# Patient Record
Sex: FEMALE | Race: WHITE | NOT HISPANIC OR LATINO | URBAN - METROPOLITAN AREA
[De-identification: names, ages, dates, MRNs, and addresses within clinical notes are randomized per-mention and may not be internally consistent; named-entity substitution may affect disease eponyms.]

---

## 2018-09-07 ENCOUNTER — OUTPATIENT (OUTPATIENT)
Dept: OUTPATIENT SERVICES | Facility: HOSPITAL | Age: 23
LOS: 1 days | End: 2018-09-07

## 2018-09-07 DIAGNOSIS — O26.90 PREGNANCY RELATED CONDITIONS, UNSPECIFIED, UNSPECIFIED TRIMESTER: ICD-10-CM

## 2018-09-07 DIAGNOSIS — Z3A.00 WEEKS OF GESTATION OF PREGNANCY NOT SPECIFIED: ICD-10-CM

## 2018-09-08 ENCOUNTER — INPATIENT (INPATIENT)
Facility: HOSPITAL | Age: 23
LOS: 2 days | Discharge: HOME | End: 2018-09-11
Attending: SPECIALIST | Admitting: SPECIALIST

## 2018-09-08 DIAGNOSIS — O26.899 OTHER SPECIFIED PREGNANCY RELATED CONDITIONS, UNSPECIFIED TRIMESTER: ICD-10-CM

## 2018-09-08 DIAGNOSIS — Z3A.00 WEEKS OF GESTATION OF PREGNANCY NOT SPECIFIED: ICD-10-CM

## 2018-09-09 ENCOUNTER — TRANSCRIPTION ENCOUNTER (OUTPATIENT)
Age: 23
End: 2018-09-09

## 2018-09-09 VITALS — WEIGHT: 265.66 LBS | HEIGHT: 66 IN

## 2018-09-09 LAB
BASOPHILS # BLD AUTO: 0.02 K/UL — SIGNIFICANT CHANGE UP (ref 0–0.2)
BASOPHILS NFR BLD AUTO: 0.2 % — SIGNIFICANT CHANGE UP (ref 0–2)
BLD GP AB SCN SERPL QL: NEGATIVE — SIGNIFICANT CHANGE UP
EOSINOPHIL # BLD AUTO: 0.05 K/UL — SIGNIFICANT CHANGE UP (ref 0–0.5)
EOSINOPHIL NFR BLD AUTO: 0.4 % — SIGNIFICANT CHANGE UP (ref 0–6)
HCT VFR BLD CALC: 38.6 % — SIGNIFICANT CHANGE UP (ref 34.5–45)
HGB BLD-MCNC: 12.4 G/DL — SIGNIFICANT CHANGE UP (ref 11.5–15.5)
IMM GRANULOCYTES # BLD AUTO: 0.07 # — SIGNIFICANT CHANGE UP
IMM GRANULOCYTES NFR BLD AUTO: 0.6 % — SIGNIFICANT CHANGE UP (ref 0–1.5)
LYMPHOCYTES # BLD AUTO: 19.6 % — SIGNIFICANT CHANGE UP (ref 13–44)
LYMPHOCYTES # BLD AUTO: 2.22 K/UL — SIGNIFICANT CHANGE UP (ref 1–3.3)
MCHC RBC-ENTMCNC: 26.6 PG — LOW (ref 27–34)
MCHC RBC-ENTMCNC: 32.1 % — SIGNIFICANT CHANGE UP (ref 32–36)
MCV RBC AUTO: 82.8 FL — SIGNIFICANT CHANGE UP (ref 80–100)
MONOCYTES # BLD AUTO: 0.66 K/UL — SIGNIFICANT CHANGE UP (ref 0–0.9)
MONOCYTES NFR BLD AUTO: 5.8 % — SIGNIFICANT CHANGE UP (ref 2–14)
NEUTROPHILS # BLD AUTO: 8.29 K/UL — HIGH (ref 1.8–7.4)
NEUTROPHILS NFR BLD AUTO: 73.4 % — SIGNIFICANT CHANGE UP (ref 43–77)
NRBC # FLD: 0 — SIGNIFICANT CHANGE UP
PLATELET # BLD AUTO: 245 K/UL — SIGNIFICANT CHANGE UP (ref 150–400)
PMV BLD: 11.8 FL — SIGNIFICANT CHANGE UP (ref 7–13)
RBC # BLD: 4.66 M/UL — SIGNIFICANT CHANGE UP (ref 3.8–5.2)
RBC # FLD: 14.7 % — HIGH (ref 10.3–14.5)
RH IG SCN BLD-IMP: POSITIVE — SIGNIFICANT CHANGE UP
RH IG SCN BLD-IMP: POSITIVE — SIGNIFICANT CHANGE UP
WBC # BLD: 11.31 K/UL — HIGH (ref 3.8–10.5)
WBC # FLD AUTO: 11.31 K/UL — HIGH (ref 3.8–10.5)

## 2018-09-09 RX ORDER — ACETAMINOPHEN 500 MG
975 TABLET ORAL EVERY 6 HOURS
Qty: 0 | Refills: 0 | Status: COMPLETED | OUTPATIENT
Start: 2018-09-09 | End: 2019-08-08

## 2018-09-09 RX ORDER — SIMETHICONE 80 MG/1
80 TABLET, CHEWABLE ORAL EVERY 6 HOURS
Qty: 0 | Refills: 0 | Status: DISCONTINUED | OUTPATIENT
Start: 2018-09-09 | End: 2018-09-11

## 2018-09-09 RX ORDER — TETANUS TOXOID, REDUCED DIPHTHERIA TOXOID AND ACELLULAR PERTUSSIS VACCINE, ADSORBED 5; 2.5; 8; 8; 2.5 [IU]/.5ML; [IU]/.5ML; UG/.5ML; UG/.5ML; UG/.5ML
0.5 SUSPENSION INTRAMUSCULAR ONCE
Qty: 0 | Refills: 0 | Status: COMPLETED | OUTPATIENT
Start: 2018-09-09

## 2018-09-09 RX ORDER — OXYTOCIN 10 UNIT/ML
6 VIAL (ML) INJECTION
Qty: 30 | Refills: 0 | Status: DISCONTINUED | OUTPATIENT
Start: 2018-09-09 | End: 2018-09-10

## 2018-09-09 RX ORDER — PRAMOXINE HYDROCHLORIDE 150 MG/15G
1 AEROSOL, FOAM RECTAL EVERY 4 HOURS
Qty: 0 | Refills: 0 | Status: DISCONTINUED | OUTPATIENT
Start: 2018-09-09 | End: 2018-09-10

## 2018-09-09 RX ORDER — OXYTOCIN 10 UNIT/ML
333.33 VIAL (ML) INJECTION
Qty: 20 | Refills: 0 | Status: DISCONTINUED | OUTPATIENT
Start: 2018-09-09 | End: 2018-09-09

## 2018-09-09 RX ORDER — GLYCERIN ADULT
1 SUPPOSITORY, RECTAL RECTAL AT BEDTIME
Qty: 0 | Refills: 0 | Status: DISCONTINUED | OUTPATIENT
Start: 2018-09-09 | End: 2018-09-11

## 2018-09-09 RX ORDER — AMPICILLIN TRIHYDRATE 250 MG
1 CAPSULE ORAL EVERY 4 HOURS
Qty: 0 | Refills: 0 | Status: DISCONTINUED | OUTPATIENT
Start: 2018-09-09 | End: 2018-09-10

## 2018-09-09 RX ORDER — IBUPROFEN 200 MG
600 TABLET ORAL EVERY 6 HOURS
Qty: 0 | Refills: 0 | Status: DISCONTINUED | OUTPATIENT
Start: 2018-09-09 | End: 2018-09-11

## 2018-09-09 RX ORDER — HYDROCORTISONE 1 %
1 OINTMENT (GRAM) TOPICAL EVERY 4 HOURS
Qty: 0 | Refills: 0 | Status: DISCONTINUED | OUTPATIENT
Start: 2018-09-09 | End: 2018-09-09

## 2018-09-09 RX ORDER — SODIUM CHLORIDE 9 MG/ML
1000 INJECTION, SOLUTION INTRAVENOUS ONCE
Qty: 0 | Refills: 0 | Status: COMPLETED | OUTPATIENT
Start: 2018-09-09 | End: 2018-09-09

## 2018-09-09 RX ORDER — AER TRAVELER 0.5 G/1
1 SOLUTION RECTAL; TOPICAL EVERY 4 HOURS
Qty: 0 | Refills: 0 | Status: DISCONTINUED | OUTPATIENT
Start: 2018-09-09 | End: 2018-09-11

## 2018-09-09 RX ORDER — OXYCODONE HYDROCHLORIDE 5 MG/1
5 TABLET ORAL EVERY 4 HOURS
Qty: 0 | Refills: 0 | Status: DISCONTINUED | OUTPATIENT
Start: 2018-09-09 | End: 2018-09-11

## 2018-09-09 RX ORDER — OXYTOCIN 10 UNIT/ML
41.67 VIAL (ML) INJECTION
Qty: 20 | Refills: 0 | Status: DISCONTINUED | OUTPATIENT
Start: 2018-09-09 | End: 2018-09-10

## 2018-09-09 RX ORDER — IBUPROFEN 200 MG
600 TABLET ORAL EVERY 6 HOURS
Qty: 0 | Refills: 0 | Status: COMPLETED | OUTPATIENT
Start: 2018-09-09 | End: 2019-08-08

## 2018-09-09 RX ORDER — HYDROCORTISONE 1 %
1 OINTMENT (GRAM) TOPICAL EVERY 4 HOURS
Qty: 0 | Refills: 0 | Status: DISCONTINUED | OUTPATIENT
Start: 2018-09-09 | End: 2018-09-10

## 2018-09-09 RX ORDER — SODIUM CHLORIDE 9 MG/ML
3 INJECTION INTRAMUSCULAR; INTRAVENOUS; SUBCUTANEOUS EVERY 8 HOURS
Qty: 0 | Refills: 0 | Status: DISCONTINUED | OUTPATIENT
Start: 2018-09-09 | End: 2018-09-11

## 2018-09-09 RX ORDER — OXYCODONE HYDROCHLORIDE 5 MG/1
5 TABLET ORAL
Qty: 0 | Refills: 0 | Status: DISCONTINUED | OUTPATIENT
Start: 2018-09-09 | End: 2018-09-11

## 2018-09-09 RX ORDER — DIBUCAINE 1 %
1 OINTMENT (GRAM) RECTAL EVERY 4 HOURS
Qty: 0 | Refills: 0 | Status: DISCONTINUED | OUTPATIENT
Start: 2018-09-09 | End: 2018-09-09

## 2018-09-09 RX ORDER — PRAMOXINE HYDROCHLORIDE 150 MG/15G
1 AEROSOL, FOAM RECTAL EVERY 4 HOURS
Qty: 0 | Refills: 0 | Status: DISCONTINUED | OUTPATIENT
Start: 2018-09-09 | End: 2018-09-09

## 2018-09-09 RX ORDER — OXYTOCIN 10 UNIT/ML
333.33 VIAL (ML) INJECTION
Qty: 20 | Refills: 0 | Status: COMPLETED | OUTPATIENT
Start: 2018-09-09

## 2018-09-09 RX ORDER — DOCUSATE SODIUM 100 MG
100 CAPSULE ORAL
Qty: 0 | Refills: 0 | Status: DISCONTINUED | OUTPATIENT
Start: 2018-09-09 | End: 2018-09-11

## 2018-09-09 RX ORDER — LANOLIN
1 OINTMENT (GRAM) TOPICAL EVERY 6 HOURS
Qty: 0 | Refills: 0 | Status: DISCONTINUED | OUTPATIENT
Start: 2018-09-09 | End: 2018-09-11

## 2018-09-09 RX ORDER — DIPHENHYDRAMINE HCL 50 MG
25 CAPSULE ORAL EVERY 6 HOURS
Qty: 0 | Refills: 0 | Status: DISCONTINUED | OUTPATIENT
Start: 2018-09-09 | End: 2018-09-11

## 2018-09-09 RX ORDER — ACETAMINOPHEN 500 MG
975 TABLET ORAL EVERY 6 HOURS
Qty: 0 | Refills: 0 | Status: DISCONTINUED | OUTPATIENT
Start: 2018-09-09 | End: 2018-09-11

## 2018-09-09 RX ORDER — SODIUM CHLORIDE 9 MG/ML
1000 INJECTION, SOLUTION INTRAVENOUS
Qty: 0 | Refills: 0 | Status: DISCONTINUED | OUTPATIENT
Start: 2018-09-09 | End: 2018-09-09

## 2018-09-09 RX ORDER — DIBUCAINE 1 %
1 OINTMENT (GRAM) RECTAL EVERY 4 HOURS
Qty: 0 | Refills: 0 | Status: DISCONTINUED | OUTPATIENT
Start: 2018-09-09 | End: 2018-09-11

## 2018-09-09 RX ORDER — MAGNESIUM HYDROXIDE 400 MG/1
30 TABLET, CHEWABLE ORAL
Qty: 0 | Refills: 0 | Status: DISCONTINUED | OUTPATIENT
Start: 2018-09-09 | End: 2018-09-11

## 2018-09-09 RX ORDER — KETOROLAC TROMETHAMINE 30 MG/ML
30 SYRINGE (ML) INJECTION ONCE
Qty: 0 | Refills: 0 | Status: DISCONTINUED | OUTPATIENT
Start: 2018-09-09 | End: 2018-09-11

## 2018-09-09 RX ORDER — AER TRAVELER 0.5 G/1
1 SOLUTION RECTAL; TOPICAL EVERY 4 HOURS
Qty: 0 | Refills: 0 | Status: DISCONTINUED | OUTPATIENT
Start: 2018-09-09 | End: 2018-09-09

## 2018-09-09 RX ORDER — SODIUM CHLORIDE 9 MG/ML
3 INJECTION INTRAMUSCULAR; INTRAVENOUS; SUBCUTANEOUS EVERY 8 HOURS
Qty: 0 | Refills: 0 | Status: DISCONTINUED | OUTPATIENT
Start: 2018-09-09 | End: 2018-09-09

## 2018-09-09 RX ORDER — AMPICILLIN TRIHYDRATE 250 MG
2 CAPSULE ORAL ONCE
Qty: 0 | Refills: 0 | Status: COMPLETED | OUTPATIENT
Start: 2018-09-09 | End: 2018-09-09

## 2018-09-09 RX ORDER — AMPICILLIN TRIHYDRATE 250 MG
CAPSULE ORAL
Qty: 0 | Refills: 0 | Status: DISCONTINUED | OUTPATIENT
Start: 2018-09-09 | End: 2018-09-10

## 2018-09-09 RX ADMIN — Medication 600 MILLIGRAM(S): at 21:03

## 2018-09-09 RX ADMIN — SODIUM CHLORIDE 2000 MILLILITER(S): 9 INJECTION, SOLUTION INTRAVENOUS at 03:00

## 2018-09-09 RX ADMIN — Medication 600 MILLIGRAM(S): at 21:40

## 2018-09-09 RX ADMIN — SODIUM CHLORIDE 250 MILLILITER(S): 9 INJECTION, SOLUTION INTRAVENOUS at 03:21

## 2018-09-09 RX ADMIN — Medication 216 GRAM(S): at 03:20

## 2018-09-09 RX ADMIN — Medication 1000 MILLIUNIT(S)/MIN: at 07:20

## 2018-09-09 NOTE — DISCHARGE NOTE OB - MATERIALS PROVIDED
Tdap Vaccination (VIS Pub Date: 2012)/Vaccinations/  Immunization Record/Shaken Baby Prevention Handout/Guide to Postpartum Care/Birth Certificate Instructions/[facility] [place] Screening Program/[facility] Hearing Screen Program

## 2018-09-09 NOTE — PATIENT PROFILE OB - PRO FEM REPRO BREASTFEED YN
Subjective


Post Operative Day:  5


Remarks


Postoperative day number 5. Afebrile overnight. Blood pressure stable up to 100/

70s. States pain is better at 8/10 and is currently sleeping comfortably. CT of 

abdomen/pelvis was negative for pathology. Patient is on gentamicin and 

clindamycin IV, but will discontinue as this is not infectious. Incision not 

draining. Decreased lochia.  Denies dysuria.  No breast tenderness. Appetite 

good. Occasional nausea. Endorses flatus and bowel movement.  Ambulating well.  

Denies calf pain, shortness of breath, or cough.





Objective


Vitals/I&O





Vital Signs








  Date Time  Temp Pulse Resp B/P (MAP) Pulse Ox O2 Delivery O2 Flow Rate FiO2


 


17 03:45 98.1 61 18 101/61 (74)    


 


17 20:30 97.9 68 20 101/68 (79)    








Result Diagram:  


17 1409                                                                    

            9/3/17 0906





Objective Remarks


GENERAL: Well-nourished, well-developed patient.


CARDIOVASCULAR: Regular rate and rhythm


RESPIRATORY: Breath sounds equal bilaterally. No accessory muscle use.


ABDOMEN/GI: Abdomen distended, diffusely tender, especially on left abdomen. No 

rebound tenderness.


   Incision: Clean, dry and intact.


   Fundus: Firm, -tender at umbilicus.


GENITOURINARY: Light to moderate bleeding.


EXTREMITIES: No cyanosis or edema, non-tender, without signs of DVT.


Medications and IVs





Current Medications








 Medications


  (Trade)  Dose


 Ordered  Sig/Geoffrey


 Route  Start Time


 Stop Time Status Last Admin


 


 Lactated Ringer's  1,000 ml @ 


 150 mls/hr  Q6H40M


 IV  17 07:29


    17 07:51


 


 


  (NS Flush)  2 ml  BID


 IV FLUSH  17 21:00


    9/3/17 20:14


 


 


  (NS Flush)  2 ml  UNSCH  PRN


 IV FLUSH  17 10:00


    17 03:45


 


 


  (Mylicon Chew)  80 mg  QID  PRN


 PO  17 10:00


    17 10:54


 


 


  (Elena-Colace)  2 tab  Q12H  PRN


 PO  17 10:00


    9/3/17 20:12


 


 


  (Ambien)  5 mg  HS  PRN


 PO  17 10:00


     


 


 


  (Zofran Inj)  4 mg  Q6H  PRN


 IV PUSH  17 10:00


    17 04:27


 


 


  (Benadryl Inj)  25 mg  Q4H  PRN


 IV PUSH  17 14:30


     


 


 


  (Benadryl)  25 mg  Q4H  PRN


 PO  17 14:30


    17 14:51


 


 


  (Morphine Inj)  4 mg  Q3H  PRN


 IV PUSH  17 12:45


     


 


 


  (Roxicodone)  5 mg  Q4H  PRN


 PO  17 12:45


    17 03:43


 


 


  (Roxicodone)  10 mg  Q4H  PRN


 PO  17 12:45


    17 04:28


 


 


  (Toradol Inj)  30 mg  Q6H  PRN


 IV PUSH  17 12:45


 17 12:44  17 03:45


 


 


  (Ofirmev 1000 Mg/


 100 ml Inj)  1,000 mg  Q6H


 IV  17 21:30


    17 03:45


 


 


 Clindamycin


 Phosphate 900 mg/


 Sodium Chloride  106 ml @ 


 212 mls/hr  Q8H


 IV  9/3/17 22:00


    17 06:24


 


 


 Gentamicin


 Sulfate/Sodium


 Chloride  100 ml @ 


 200 mls/hr  Q8H


 IV  9/3/17 23:00


    17 06:24


 











Assessment/Plan


Problem List:  


(1) Twin birth delivered by  section in hospital


ICD Codes:  Z38.31 - Twin liveborn infant, delivered by 


(2)  delivery delivered


ICD Codes:  O82 - Encounter for  delivery without indication


Assessment and Plan


22 y/o  female who is POD #5 s/p  for  labor and twin 

gestation at 33-5/7 weeks gestation. Patient reports continued 8 out of 10 

diffuse abdominal pain that is not helped by pain medications.  





1. abdominal pain, BS minimal , diffusely tender to palpation, uterus firm


-CBC to r/o intraperitoneal bleeding or hematoma. Post-op Hgb was stable at 9.0.


   -Repeat: improved to 9.8/30


-ABX discontinued as likely not infectious process


-Patient to be discharged home with Percocet and Motrin for pain





2. routine postpartum care advance ambulation , monitor BP & pulse.


-Encouraged OOB. Advised pelvic rest for 6 wks.  Will need a f/u appt. in 1 wk 

for incision check. 


-Re: birth ctrl, she is s/p bilateral tubal ligation.





dw Dr. Manriquez


Discharge Planning


Today, 











Shady Godwin MD R2 Sep 5, 2017 08:15 not applicable

## 2018-09-09 NOTE — DISCHARGE NOTE OB - CARE PROVIDER_API CALL
Preston Segura (MD), Obstetrics and Gynecology  [address] [facility]  [address] [place] [address]  Phone: [phone]  Fax: [phone]

## 2018-09-09 NOTE — DISCHARGE NOTE OB - PATIENT PORTAL LINK FT
You can access the [facility] Patient Portal, offered by [facility], by registering with the following website: [email or web address]

## 2018-09-10 LAB — T PALLIDUM AB TITR SER: NEGATIVE — SIGNIFICANT CHANGE UP

## 2018-09-10 RX ORDER — HYDROCORTISONE 1 %
1 OINTMENT (GRAM) TOPICAL EVERY 4 HOURS
Qty: 0 | Refills: 0 | Status: DISCONTINUED | OUTPATIENT
Start: 2018-09-10 | End: 2018-09-11

## 2018-09-10 RX ORDER — PRAMOXINE HYDROCHLORIDE 150 MG/15G
1 AEROSOL, FOAM RECTAL EVERY 4 HOURS
Qty: 0 | Refills: 0 | Status: DISCONTINUED | OUTPATIENT
Start: 2018-09-10 | End: 2018-09-11

## 2018-09-10 RX ADMIN — Medication 600 MILLIGRAM(S): at 06:50

## 2018-09-10 RX ADMIN — Medication 600 MILLIGRAM(S): at 16:00

## 2018-09-10 RX ADMIN — Medication 975 MILLIGRAM(S): at 10:00

## 2018-09-10 RX ADMIN — Medication 600 MILLIGRAM(S): at 06:20

## 2018-09-10 RX ADMIN — Medication 1 TABLET(S): at 13:13

## 2018-09-10 RX ADMIN — Medication 975 MILLIGRAM(S): at 09:25

## 2018-09-10 RX ADMIN — Medication 600 MILLIGRAM(S): at 23:30

## 2018-09-10 RX ADMIN — Medication 600 MILLIGRAM(S): at 17:00

## 2018-09-10 RX ADMIN — Medication 975 MILLIGRAM(S): at 23:30

## 2018-09-10 RX ADMIN — Medication 975 MILLIGRAM(S): at 18:07

## 2018-09-10 RX ADMIN — Medication 975 MILLIGRAM(S): at 22:31

## 2018-09-10 RX ADMIN — Medication 600 MILLIGRAM(S): at 22:31

## 2018-09-10 NOTE — PROGRESS NOTE ADULT - SUBJECTIVE AND OBJECTIVE BOX
Anesthesia Post-op Note    POD#1 S/P vaginal delivery wit h epidural anesthesia.    Patient is doing well.  OOBAA. Tolerating clears.  Pain is tolerable.  No residual anesthetic issues or complications noted.    Fiorella Golden CRNA

## 2018-09-11 VITALS
DIASTOLIC BLOOD PRESSURE: 68 MMHG | HEART RATE: 67 BPM | RESPIRATION RATE: 18 BRPM | OXYGEN SATURATION: 99 % | TEMPERATURE: 98 F | SYSTOLIC BLOOD PRESSURE: 131 MMHG

## 2018-09-11 RX ORDER — TETANUS TOXOID, REDUCED DIPHTHERIA TOXOID AND ACELLULAR PERTUSSIS VACCINE, ADSORBED 5; 2.5; 8; 8; 2.5 [IU]/.5ML; [IU]/.5ML; UG/.5ML; UG/.5ML; UG/.5ML
0.5 SUSPENSION INTRAMUSCULAR ONCE
Qty: 0 | Refills: 0 | Status: COMPLETED | OUTPATIENT
Start: 2018-09-11 | End: 2018-09-11

## 2018-09-11 RX ADMIN — Medication 975 MILLIGRAM(S): at 10:33

## 2018-09-11 RX ADMIN — Medication 975 MILLIGRAM(S): at 18:00

## 2018-09-11 RX ADMIN — Medication 1 TABLET(S): at 14:48

## 2018-09-11 RX ADMIN — Medication 600 MILLIGRAM(S): at 14:00

## 2018-09-11 RX ADMIN — Medication 600 MILLIGRAM(S): at 14:48

## 2018-09-11 RX ADMIN — Medication 975 MILLIGRAM(S): at 17:00

## 2018-09-11 RX ADMIN — Medication 975 MILLIGRAM(S): at 09:45

## 2018-09-11 RX ADMIN — Medication 600 MILLIGRAM(S): at 05:40

## 2018-09-11 RX ADMIN — Medication 975 MILLIGRAM(S): at 05:40

## 2018-09-11 RX ADMIN — TETANUS TOXOID, REDUCED DIPHTHERIA TOXOID AND ACELLULAR PERTUSSIS VACCINE, ADSORBED 0.5 MILLILITER(S): 5; 2.5; 8; 8; 2.5 SUSPENSION INTRAMUSCULAR at 11:15

## 2019-02-14 ENCOUNTER — APPOINTMENT (OUTPATIENT)
Dept: ORTHOPEDIC SURGERY | Facility: CLINIC | Age: 24
End: 2019-02-14
Payer: MEDICARE

## 2019-02-14 VITALS
HEIGHT: 66.73 IN | DIASTOLIC BLOOD PRESSURE: 80 MMHG | HEART RATE: 95 BPM | SYSTOLIC BLOOD PRESSURE: 130 MMHG | OXYGEN SATURATION: 98 % | WEIGHT: 237 LBS | BODY MASS INDEX: 37.64 KG/M2

## 2019-02-14 PROCEDURE — 99204 OFFICE O/P NEW MOD 45 MIN: CPT

## 2019-02-14 NOTE — HISTORY OF PRESENT ILLNESS
[FreeTextEntry1] : This is an initial visit for this  23 year old  woman, last seen by me nearly 4 years ago when in her second trimester of first pregnancy. The patient was managed through the pregnancy with low dose thyroid hormone replacement.\par There is  FH of thyroid disease, mother is on thyroid hormone replacement.\par The patient presents now, 5 months postpartum with fatigue, muscle pain, and postpartum weight loss hitting a plateau.\par \par Today, labs done 2/4/19 and prior to that, 11/26/18 were rev:\par TSH: 91.64, prior 0.026\par Free T4: 0.65, prior: 3.24 (0.8-1.9)

## 2019-02-14 NOTE — PHYSICAL EXAM
[Alert] : alert [No Acute Distress] : no acute distress [Normal Sclera/Conjunctiva] : normal sclera/conjunctiva [No Proptosis] : no proptosis [No Neck Mass] : no neck mass was observed [Thyroid Not Enlarged] : the thyroid was not enlarged [Post Cervical Nodes] : posterior cervical nodes [Anterior Cervical Nodes] : anterior cervical nodes [Normal] : normal and non tender [Kyphosis] : no kyphosis present [Scoliosis] : scoliosis not present [No Stigmata of Cushings Syndrome] : no stigmata of cushings syndrome [Normal Gait] : normal gait [No Clubbing, Cyanosis] : no clubbing  or cyanosis of the fingernails [No Tremors] : no tremors [Oriented x3] : oriented to person, place, and time [Normal Affect] : the affect was normal [de-identified] : obese [de-identified] : depressed knee jerk reflex, bicep reflex 1-2

## 2019-02-14 NOTE — ASSESSMENT
[FreeTextEntry1] : \par 23 year old woman with autoimmune thyroid disease and a fairly classic presentation of postpartum thyroiditis. There was evidence for elevated Free T4 when her baby was 2 months and she is now overtly hypothyroid when baby is 5 months. She is fatigued, has had difficulty losing weight, has sallow complexion and muscle pain.\par \par Plan:\par 1. will titrate LT4 up over the first week of Rx: 25 mcg for day 1 and 2, 50 mcg for days 3-5 then 100 mcg for the next 4-5 weeks\par 2. f/u labs in 5 weeks on full dose of 100 mcg\par 3. pt may require more LT4, we will reassess at 6 weeks\par 4. the natural hx of postpartum thyroiditis was rev and plans for testing patient off medication at a year were rev with the pt; as well as discussion regarding thyroid replacement in future pregnancy\par 5. f/u end March 2019

## 2019-02-14 NOTE — CONSULT LETTER
[Dear  ___] : Dear  [unfilled], [Please see my note below.] : Please see my note below. [Consult Closing:] : Thank you very much for allowing me to participate in the care of this patient.  If you have any questions, please do not hesitate to contact me. [Sincerely,] : Sincerely, [Courtesy Letter:] : I had the pleasure of seeing your patient, [unfilled], in my office today. [FreeTextEntry2] : Dr. Juliano Charles\par [address]\[place] [address] [FreeTextEntry3] : Dorina Craven MD\par Endocrinology, Bone and Mineral Metabolism\par

## 2019-02-14 NOTE — REVIEW OF SYSTEMS
[Fatigue] : fatigue [Recent Weight Gain (___ Lbs)] : recent [unfilled] ~Ulb weight gain [Joint Pain] : joint pain [Muscle Weakness] : muscle weakness [Dry Skin] : dry skin [Cold Intolerance] : cold intolerant [All other systems negative] : All other systems negative [FreeTextEntry2] : Weakness [FreeTextEntry9] : Bone pain [de-identified] : psoriasis [de-identified] : mood swings

## 2019-06-13 ENCOUNTER — APPOINTMENT (OUTPATIENT)
Dept: ORTHOPEDIC SURGERY | Facility: CLINIC | Age: 24
End: 2019-06-13
Payer: COMMERCIAL

## 2019-06-13 VITALS
DIASTOLIC BLOOD PRESSURE: 80 MMHG | OXYGEN SATURATION: 98 % | BODY MASS INDEX: 38.57 KG/M2 | HEART RATE: 98 BPM | HEIGHT: 66.14 IN | SYSTOLIC BLOOD PRESSURE: 128 MMHG | WEIGHT: 240 LBS

## 2019-06-13 DIAGNOSIS — E06.3 AUTOIMMUNE THYROIDITIS: ICD-10-CM

## 2019-06-13 PROCEDURE — 99214 OFFICE O/P EST MOD 30 MIN: CPT

## 2019-06-13 RX ORDER — LEVOTHYROXINE SODIUM 0.1 MG/1
100 TABLET ORAL
Qty: 30 | Refills: 4 | Status: ACTIVE | COMMUNITY
Start: 2019-02-14 | End: 1900-01-01

## 2019-06-13 NOTE — HISTORY OF PRESENT ILLNESS
[FreeTextEntry1] : This is a 4 month f/u visit for this  23 year old  woman, last seen by me nearly 4 years ago when in her second trimester of first pregnancy. The patient was managed through the pregnancy with low dose thyroid hormone replacement.\par There is  FH of thyroid disease, mother is on thyroid hormone replacement.\par The patient presented  5 months postpartum with fatigue, muscle pain, and postpartum weight loss hitting a plateau.\par \par at initial visit Feb 2019, dx of postpartum thyroiditis was made\par the patient was begun on escalating doses of LT4\par she is currently on 100 mcg \par TFTs are now WNL\par \par Today, labs done 6/3/19 were rev and compared with:  2/4/19 and 11/26/18\par TSH: is now 1.48; had been:  91.64, prior 0.026\par Free T4: 1.3, had been:  0.65, prior: 3.24 (0.8-1.9)\par \par anti thyroglobulin: 68, elevated\par thyroid peroxidase: 219, elevated

## 2019-06-13 NOTE — CONSULT LETTER
[Dear  ___] : Dear  [unfilled], [Courtesy Letter:] : I had the pleasure of seeing your patient, [unfilled], in my office today. [Please see my note below.] : Please see my note below. [Sincerely,] : Sincerely, [Consult Closing:] : Thank you very much for allowing me to participate in the care of this patient.  If you have any questions, please do not hesitate to contact me. [FreeTextEntry2] : Dr. Juliano Charles\par [address]\[place] [address] [FreeTextEntry3] : Dorina Craven MD\par Endocrinology, Bone and Mineral Metabolism\par

## 2019-06-13 NOTE — ASSESSMENT
[FreeTextEntry1] : \par 23 year old woman with autoimmune thyroid disease and a fairly classic presentation of postpartum thyroiditis. There was evidence for elevated Free T4 when her baby was 2 months and the patient presented overtly hypothyroid when her baby was 5 months in Feb 2019. She was fatigued, had difficulty losing weight, had sallow complexion and muscle pain.\par Levothyroxine was begun Feb 2019 and titrated upward from 25, to 50, then to 100 mcg.\par Updated TFTs are normal. Thyroid antibodies are positive as noted above.\par \par Plan:\par 1. continue LT4 100 mcg until baby is 1 year old, Sept 2019\par 2. at that juncture, hold LT4 for 4 weeks; check TFTs off levothyroxine to see if hypothyroidism recurs; if so, pt will require permanent thyroid hormone replacement\par 3. if thyroid function is normal after LT4 withdrawal, the patient can continue off medication but should resume 100 mcg prior to attempting future pregnancy.\par 4. f/u after LT4 withdrawal

## 2023-11-14 NOTE — PATIENT PROFILE OB - MEDICAL/SURG HX
No reported exacerbations, currently on Advair twice daily and albuterol as needed. Does follow with pulmonology and has a follow-up appointment next week. For Medical Surgical Hx obtained at Admission, Please see Provider H&P

## 2024-01-11 NOTE — PATIENT PROFILE OB - PHONE #
Quality 110: Preventive Care And Screening: Influenza Immunization: Influenza Immunization Administered during Influenza season Quality 226: Preventive Care And Screening: Tobacco Use: Screening And Cessation Intervention: Patient screened for tobacco use and is an ex/non-smoker Quality 402: Tobacco Use And Help With Quitting Among Adolescents: Patient screened for tobacco and never smoked Quality 130: Documentation Of Current Medications In The Medical Record: Current Medications Documented Detail Level: Detailed Quality 431: Preventive Care And Screening: Unhealthy Alcohol Use - Screening: Patient screened for unhealthy alcohol use using a single question and scores less than 2 times per year [phone]